# Patient Record
Sex: FEMALE | Race: WHITE | Employment: UNEMPLOYED | URBAN - METROPOLITAN AREA
[De-identification: names, ages, dates, MRNs, and addresses within clinical notes are randomized per-mention and may not be internally consistent; named-entity substitution may affect disease eponyms.]

---

## 2020-11-03 ENCOUNTER — OFFICE VISIT (OUTPATIENT)
Dept: FAMILY MEDICINE CLINIC | Age: 55
End: 2020-11-03
Payer: COMMERCIAL

## 2020-11-03 VITALS
SYSTOLIC BLOOD PRESSURE: 118 MMHG | TEMPERATURE: 97.5 F | HEIGHT: 67 IN | WEIGHT: 137 LBS | HEART RATE: 77 BPM | OXYGEN SATURATION: 98 % | BODY MASS INDEX: 21.5 KG/M2 | DIASTOLIC BLOOD PRESSURE: 70 MMHG

## 2020-11-03 PROBLEM — Z98.890 HISTORY OF RADIAL KERATOTOMY: Status: ACTIVE | Noted: 2019-12-05

## 2020-11-03 PROBLEM — M35.01 KERATITIS SICCA, BILATERAL (HCC): Status: ACTIVE | Noted: 2019-12-05

## 2020-11-03 PROBLEM — H16.223 KERATITIS SICCA, BILATERAL: Status: ACTIVE | Noted: 2019-12-05

## 2020-11-03 PROBLEM — H16.143 SPK (SUPERFICIAL PUNCTATE KERATITIS), BILATERAL: Status: ACTIVE | Noted: 2019-12-05

## 2020-11-03 PROBLEM — M85.80 OSTEOPENIA: Status: ACTIVE | Noted: 2017-07-19

## 2020-11-03 PROBLEM — G47.00 INSOMNIA: Status: ACTIVE | Noted: 2017-07-19

## 2020-11-03 PROBLEM — G47.9 SLEEP DISTURBANCE: Status: ACTIVE | Noted: 2017-07-19

## 2020-11-03 PROBLEM — G56.01 CARPAL TUNNEL SYNDROME OF RIGHT WRIST: Status: ACTIVE | Noted: 2020-11-03

## 2020-11-03 PROCEDURE — 90471 IMMUNIZATION ADMIN: CPT | Performed by: INTERNAL MEDICINE

## 2020-11-03 PROCEDURE — 90686 IIV4 VACC NO PRSV 0.5 ML IM: CPT | Performed by: INTERNAL MEDICINE

## 2020-11-03 PROCEDURE — 99203 OFFICE O/P NEW LOW 30 MIN: CPT | Performed by: INTERNAL MEDICINE

## 2020-11-03 RX ORDER — OYSTER SHELL CALCIUM WITH VITAMIN D 500; 200 MG/1; [IU]/1
1 TABLET, FILM COATED ORAL DAILY
COMMUNITY
Start: 2019-11-18

## 2020-11-03 RX ORDER — MULTIVIT WITH MINERALS/LUTEIN
250 TABLET ORAL DAILY
COMMUNITY

## 2020-11-03 SDOH — HEALTH STABILITY: MENTAL HEALTH: HOW MANY STANDARD DRINKS CONTAINING ALCOHOL DO YOU HAVE ON A TYPICAL DAY?: 1 OR 2

## 2020-11-03 SDOH — HEALTH STABILITY: MENTAL HEALTH: HOW OFTEN DO YOU HAVE A DRINK CONTAINING ALCOHOL?: 2-4 TIMES A MONTH

## 2020-11-03 ASSESSMENT — PATIENT HEALTH QUESTIONNAIRE - PHQ9
SUM OF ALL RESPONSES TO PHQ QUESTIONS 1-9: 0
2. FEELING DOWN, DEPRESSED OR HOPELESS: 0
SUM OF ALL RESPONSES TO PHQ QUESTIONS 1-9: 0
SUM OF ALL RESPONSES TO PHQ9 QUESTIONS 1 & 2: 0
SUM OF ALL RESPONSES TO PHQ QUESTIONS 1-9: 0
1. LITTLE INTEREST OR PLEASURE IN DOING THINGS: 0

## 2020-11-03 NOTE — PROGRESS NOTES
[address] [facility] [id number]     Date of Visit:  11/3/2020  Patient Name: Marquis Rodriguez   Patient :  1965     CHIEF COMPLAINT:     Marquis Rodriguez is a 54 y.o. female who presents today for an general visit to be evaluated for the following condition(s):  Chief Complaint   Patient presents with    New Patient   [address]     Patient also with recent work up due to sudden leg heaviness and weakness as well as foot drop on left. Patient overall a very active person, exercising daily prior to these symptoms. Her gait has been unsteady since even with episodes of falling. Has been working with neurology - has had MRI thoracic spine and EMG completed as part of the work up. REVIEW OF SYSTEM      Review of Systems   Constitutional: Negative. HENT: Negative. Eyes: Negative. Respiratory: Negative. Cardiovascular: Negative. Gastrointestinal: Negative. Endocrine: Negative. Genitourinary: Negative. Musculoskeletal:        Bilateral leg heaviness and weakness   Skin: Negative. Allergic/Immunologic: Negative. Neurological: Negative. Hematological: Negative. Psychiatric/Behavioral: Negative. HISTORY OF PRESENT ILLNESS     HPI  Patient also with recent work up due to sudden leg heaviness and weakness as well as foot drop on left. Patient overall a very active person, exercising daily prior to these symptoms. Her gait has been unsteady since even with episodes of falling. Has been working with neurology - has had MRI thoracic spine and EMG completed as part of the work up.   REVIEWED INFORMATION      No Known Allergies    Patient Active Problem List   Diagnosis    Carpal tunnel syndrome of right wrist    History of radial keratotomy    Insomnia    Keratitis sicca, bilateral ([address]    Osteopenia    Sleep disturbance    SPK (superficial punctate keratitis), bilateral    Somatic dysfunction of hip region    Pain in both lower extremities    Gait abnormality    Weakness of both lower extremities       Past Medical History:   Diagnosis Date    No known health problems        History reviewed. No pertinent surgical history. Social History     Socioeconomic History    Marital status:      Spouse name: None    Number of children: None    Years of education: None    Highest education level: None   Occupational History    None   Social Needs    Financial resource strain: None    Food insecurity     Worry: None     Inability: None    Transportation needs     Medical: None     Non-medical: None   Tobacco Use    Smoking status: Never Smoker    Smokeless tobacco: Never Used   Substance and Sexual Activity    Alcohol use: Yes     Frequency: 2-4 times a month     Drinks per session: 1 or 2     Binge frequency: Never     Comment: socially    Drug use: Never    Sexual activity: Yes     Partners: Male   Lifestyle    Physical activity     Days per week: None     Minutes per session: None    Stress: None   Relationships    Social connections     Talks on phone: None     Gets together: None     Attends Caodaism service: None     Active member of club or organization: None     Attends meetings of clubs or organizations: None     Relationship status: None    Intimate partner violence     Fear of current or ex partner: None     Emotionally abused: None     Physically abused: None     Forced sexual activity: None   Other Topics Concern    None   Social History Narrative    None      Family History   Problem Relation Age of Onset    No Known Problems Mother     No Known Problems Father      PHYSICAL EXAM     /70 (Site: Left Upper Arm, Position: Sitting, Cuff Size: Medium Adult)   Pulse 77   Temp 97.5 °F (36.4 °C) (Temporal)   Ht 5' 7\" (1.702 m)   Wt 137 lb (62.1 kg)   SpO2 98%   BMI 21.46 kg/m²    Physical Exam  Vitals signs and nursing note reviewed.    Constitutional: signed by Lang Feliciano MD on 11/3/2020 at 9:25 AM

## 2020-11-09 PROBLEM — M79.604 PAIN IN BOTH LOWER EXTREMITIES: Status: ACTIVE | Noted: 2020-11-09

## 2020-11-09 PROBLEM — M99.05 SOMATIC DYSFUNCTION OF HIP REGION: Status: ACTIVE | Noted: 2020-11-09

## 2020-11-09 PROBLEM — M79.605 PAIN IN BOTH LOWER EXTREMITIES: Status: ACTIVE | Noted: 2020-11-09

## 2020-11-09 PROBLEM — R29.898 WEAKNESS OF BOTH LOWER EXTREMITIES: Status: ACTIVE | Noted: 2020-11-09

## 2020-11-09 PROBLEM — R26.9 GAIT ABNORMALITY: Status: ACTIVE | Noted: 2020-11-09

## 2020-11-09 ASSESSMENT — ENCOUNTER SYMPTOMS
GASTROINTESTINAL NEGATIVE: 1
EYES NEGATIVE: 1
RESPIRATORY NEGATIVE: 1
ALLERGIC/IMMUNOLOGIC NEGATIVE: 1

## 2020-11-30 ENCOUNTER — TELEPHONE (OUTPATIENT)
Dept: FAMILY MEDICINE CLINIC | Age: 55
End: 2020-11-30

## 2020-11-30 NOTE — TELEPHONE ENCOUNTER
Patient went to [address] [place] [address] Rd     Number: [phone]     Location: [address] [place] [address].  [place], [place]

## 2020-11-30 NOTE — TELEPHONE ENCOUNTER
Can you call [address] to get the report sent to us ASAP and then scan it into the system? Once the report is scanned can you send me a message? Also - can you call the patient back and apologize for the delay as we haven't been sent the report yet. Let her know that we are re requesting the results and will let her know as soon as the results come in. Thank you.

## 2020-12-03 ENCOUNTER — HOSPITAL ENCOUNTER (OUTPATIENT)
Dept: GENERAL RADIOLOGY | Age: 55
Discharge: HOME | End: 2020-12-05
Payer: COMMERCIAL

## 2020-12-03 ENCOUNTER — HOSPITAL ENCOUNTER (OUTPATIENT)
Age: 55
Discharge: HOME | End: 2020-12-05
Payer: COMMERCIAL

## 2020-12-03 ENCOUNTER — HOSPITAL ENCOUNTER (OUTPATIENT)
Dept: ULTRASOUND IMAGING | Age: 55
Discharge: HOME | End: 2020-12-05
Payer: COMMERCIAL

## 2020-12-03 ENCOUNTER — OFFICE VISIT (OUTPATIENT)
Dept: FAMILY MEDICINE CLINIC | Age: 55
End: 2020-12-03
Payer: COMMERCIAL

## 2020-12-03 VITALS
DIASTOLIC BLOOD PRESSURE: 88 MMHG | HEIGHT: 67 IN | SYSTOLIC BLOOD PRESSURE: 102 MMHG | HEART RATE: 66 BPM | WEIGHT: 137 LBS | TEMPERATURE: 97.2 F | OXYGEN SATURATION: 98 % | BODY MASS INDEX: 21.5 KG/M2 | RESPIRATION RATE: 16 BRPM

## 2020-12-03 PROBLEM — R20.2 NUMBNESS AND TINGLING OF LEFT LOWER EXTREMITY: Status: ACTIVE | Noted: 2020-12-03

## 2020-12-03 PROBLEM — M79.89 SWELLING OF LEFT LOWER EXTREMITY: Status: ACTIVE | Noted: 2020-12-03

## 2020-12-03 PROBLEM — R20.2 NUMBNESS AND TINGLING OF RIGHT LOWER EXTREMITY: Status: ACTIVE | Noted: 2020-12-03

## 2020-12-03 PROBLEM — R26.81 UNSTEADY GAIT WHEN WALKING: Status: ACTIVE | Noted: 2020-12-03

## 2020-12-03 PROBLEM — M79.661 PAIN OF RIGHT CALF: Status: ACTIVE | Noted: 2020-12-03

## 2020-12-03 PROBLEM — R20.0 NUMBNESS AND TINGLING OF LEFT LOWER EXTREMITY: Status: ACTIVE | Noted: 2020-12-03

## 2020-12-03 PROBLEM — M79.89 SWELLING OF RIGHT EXTREMITY: Status: ACTIVE | Noted: 2020-12-03

## 2020-12-03 PROBLEM — M79.662 PAIN OF LEFT CALF: Status: ACTIVE | Noted: 2020-12-03

## 2020-12-03 PROBLEM — R20.0 NUMBNESS AND TINGLING OF RIGHT LOWER EXTREMITY: Status: ACTIVE | Noted: 2020-12-03

## 2020-12-03 PROCEDURE — 72100 X-RAY EXAM L-S SPINE 2/3 VWS: CPT

## 2020-12-03 PROCEDURE — 93971 EXTREMITY STUDY: CPT

## 2020-12-03 PROCEDURE — 99214 OFFICE O/P EST MOD 30 MIN: CPT | Performed by: INTERNAL MEDICINE

## 2020-12-03 ASSESSMENT — ENCOUNTER SYMPTOMS
EYES NEGATIVE: 1
ALLERGIC/IMMUNOLOGIC NEGATIVE: 1
GASTROINTESTINAL NEGATIVE: 1
RESPIRATORY NEGATIVE: 1

## 2020-12-03 NOTE — PROGRESS NOTES
[address] [place] [id number]     Date of Visit:  12/3/2020  Patient Name: Serena Friedman   Patient :  1965     CHIEF COMPLAINT:     Serena Friedman is a 54 y.o. female who presents today for an general visit to be evaluated for the following condition(s):  Chief Complaint   Patient presents with    Other     bilateral tingling,burning to legs,unstable at times       REVIEW OF SYSTEM      Review of Systems   Constitutional: Negative. HENT: Negative. Eyes: Negative. Respiratory: Negative. Cardiovascular: Negative. Gastrointestinal: Negative. Endocrine: Negative. Genitourinary: Negative. Musculoskeletal: Positive for arthralgias and gait problem. Pain in BLE    PAN IN CALVES L > R   Skin: Negative. Allergic/Immunologic: Negative. Neurological: Positive for weakness and numbness. Numbness/tingling BLE L > R   Hematological: Negative. Psychiatric/Behavioral: Negative. All other systems reviewed and are negative. HISTORY OF PRESENT ILLNESS     HPI  Patient here for follow up appt for bilateral leg weakness and tingling,  This has been an ongoing issue since September - she has had a full workup with neurology and has not improved. Patient also c/o significant pain/burning/tingling in bilateral calves left greater than right. Patient started PT and not sure if she has noticed much improvement yet.       REVIEWED INFORMATION      No Known Allergies    Patient Active Problem List   Diagnosis    Carpal tunnel syndrome of right wrist    History of radial keratotomy    Insomnia    Keratitis sicca, bilateral (HCC)    Osteopenia    Sleep disturbance    SPK (superficial punctate keratitis), bilateral    Somatic dysfunction of hip region    Pain in both lower extremities    Gait abnormality    Weakness of both lower extremities    Pain of left calf    Numbness and tingling of right lower extremity    Numbness and tingling of left lower extremity    Unsteady gait when walking    Swelling of right extremity    Pain of right calf    Swelling of left lower extremity       Past Medical History:   Diagnosis Date    No known health problems        History reviewed. No pertinent surgical history. Social History     Socioeconomic History    Marital status:      Spouse name: None    Number of children: None    Years of education: None    Highest education level: None   Occupational History    None   Social Needs    Financial resource strain: None    Food insecurity     Worry: None     Inability: None    Transportation needs     Medical: None     Non-medical: None   Tobacco Use    Smoking status: Never Smoker    Smokeless tobacco: Never Used   Substance and Sexual Activity    Alcohol use: Yes     Frequency: 2-4 times a month     Drinks per session: 1 or 2     Binge frequency: Never     Comment: socially    Drug use: Never    Sexual activity: Yes     Partners: Male   Lifestyle    Physical activity     Days per week: None     Minutes per session: None    Stress: None   Relationships    Social connections     Talks on phone: None     Gets together: None     Attends Confucianism service: None     Active member of club or organization: None     Attends meetings of clubs or organizations: None     Relationship status: None    Intimate partner violence     Fear of current or ex partner: None     Emotionally abused: None     Physically abused: None     Forced sexual activity: None   Other Topics Concern    None   Social History Narrative    None        PHYSICAL EXAM     /88   Pulse 66   Temp 97.2 °F (36.2 °C)   Resp 16   Ht 5' 7\" (1.702 m)   Wt 137 lb (62.1 kg)   SpO2 98%   BMI 21.46 kg/m²    Physical Exam  Vitals signs and nursing note reviewed. Constitutional:       Appearance: Normal appearance. She is normal weight.    HENT:      Head: Normocephalic and atraumatic. Right Ear: External ear normal.      Left Ear: External ear normal.      Nose: Nose normal.      Mouth/Throat:      Mouth: Mucous membranes are moist.   Eyes:      Extraocular Movements: Extraocular movements intact. Conjunctiva/sclera: Conjunctivae normal.   Neck:      Musculoskeletal: Normal range of motion. Cardiovascular:      Rate and Rhythm: Normal rate and regular rhythm. Pulmonary:      Effort: Pulmonary effort is normal.      Breath sounds: Normal breath sounds. Abdominal:      General: Abdomen is flat. Bowel sounds are normal.      Palpations: Abdomen is soft. Musculoskeletal:      Comments: + SI dysfunction at pelvis with left leg down but improved since last visit    TTP in bilateral calves L >R   Neurological:      Mental Status: She is alert. ASSESSMENT/PLAN     1. Weakness of both lower extremities  - XR LUMBAR SPINE (MIN 4 VIEWS); Future  - MRI LUMBAR SPINE WO CONTRAST; Future    2. Numbness and tingling of left lower extremity  - XR LUMBAR SPINE (MIN 4 VIEWS); Future  - MRI LUMBAR SPINE WO CONTRAST; Future  - US DUP LOWER EXTREMITY LEFT BLAKE; Future    3. Numbness and tingling of right lower extremity  - XR LUMBAR SPINE (MIN 4 VIEWS); Future  - MRI LUMBAR SPINE WO CONTRAST; Future  - US DUP LOWER EXTREMITY RIGHT BLAKE; Future    4. Unsteady gait when walking  - MRI LUMBAR SPINE WO CONTRAST; Future    5. Left foot drop  - MRI LUMBAR SPINE WO CONTRAST; Future    6. Pain of left calf  - US DUP LOWER EXTREMITY LEFT BLAKE; Future    7. Swelling of left lower extremity  - US DUP LOWER EXTREMITY LEFT BLAKE; Future    8. Pain of right calf  - US DUP LOWER EXTREMITY RIGHT BLAKE; Future    9. Swelling of right extremity  - US DUP LOWER EXTREMITY RIGHT BLAKE;  Future    Records reviewed    Bilateral calf pain/burning/tingling - check STAT bilateral lower extremity dopplers to check for DVTs     Health maint - mammogram and colonoscopy are up to date, immunizations are up to date, screening labs are up to date     Leg pain and weakness - sudden with falling - working with neurology, labs are normal, MRI thoracic spine and EMG do not show obvious cause, MRI brain normal, not improving with PT, will check MRI lumbar spine.     SI dysfunction with leg length abn due to rotation of hip - we discussed this may be part of the gait issue but unusual to present suddenly, I do recommend Physical Therapy with [facility], also to consider massage, patient to call with update     Physical and labs in 1 year     Patient to call with update      COMMUNICATION:       Electronically signed by Yash Aranda MD on 12/3/2020 at 11:40 AM

## 2020-12-08 ENCOUNTER — HOSPITAL ENCOUNTER (OUTPATIENT)
Dept: MRI IMAGING | Age: 55
Discharge: HOME | End: 2020-12-10
Payer: COMMERCIAL

## 2020-12-08 ENCOUNTER — TELEPHONE (OUTPATIENT)
Dept: FAMILY MEDICINE CLINIC | Age: 55
End: 2020-12-08

## 2020-12-08 PROCEDURE — 72148 MRI LUMBAR SPINE W/O DYE: CPT

## 2020-12-08 NOTE — RESULT ENCOUNTER NOTE
Spoke with patient regarding MRI results. I recommend to continue Physical Therapy but I also would like her to see Dr. Pretty Parks with PMR to get further evaluation and consultation on next steps.

## 2020-12-09 ENCOUNTER — TELEPHONE (OUTPATIENT)
Dept: FAMILY MEDICINE CLINIC | Age: 55
End: 2020-12-09

## 2020-12-09 NOTE — TELEPHONE ENCOUNTER
Pt called and said Dr. Corey Howe referred her to a PMR Dr. She needs a referral sent to the Dr. She was referred to. They have not received one. She also said their office is booking out till the end of January as of now.

## 2020-12-09 NOTE — TELEPHONE ENCOUNTER
Referral to PMR Dr. Juan Levine is in her chart - can you fax the referral to his office at [facility]?     Thank you

## 2020-12-14 ENCOUNTER — TELEPHONE (OUTPATIENT)
Dept: FAMILY MEDICINE CLINIC | Age: 55
End: 2020-12-14

## 2021-01-18 NOTE — TELEPHONE ENCOUNTER
PT WAITING TO HEAR FROM DR. Zakiya Mendoza Patient had a MRI done of the lumbar spine. - results are in. Patient would like a call back from PCP for Benito Torres about the results. Please advise.

## 2021-04-21 ENCOUNTER — TELEPHONE (OUTPATIENT)
Dept: FAMILY MEDICINE CLINIC | Age: 56
End: 2021-04-21

## 2021-04-21 DIAGNOSIS — R29.898 WEAKNESS OF BOTH LOWER EXTREMITIES: Primary | ICD-10-CM

## 2021-04-21 DIAGNOSIS — R20.2 NUMBNESS AND TINGLING OF LEFT LOWER EXTREMITY: ICD-10-CM

## 2021-04-21 DIAGNOSIS — R20.2 NUMBNESS AND TINGLING OF RIGHT LOWER EXTREMITY: ICD-10-CM

## 2021-04-21 DIAGNOSIS — R20.0 NUMBNESS AND TINGLING OF RIGHT LOWER EXTREMITY: ICD-10-CM

## 2021-04-21 DIAGNOSIS — R26.81 UNSTEADY GAIT WHEN WALKING: ICD-10-CM

## 2021-04-21 DIAGNOSIS — R20.0 NUMBNESS AND TINGLING OF LEFT LOWER EXTREMITY: ICD-10-CM

## 2021-04-21 NOTE — TELEPHONE ENCOUNTER
Patient is asking for a referral to a Dr. Hernandez Gautam in [place], [place]. He is a neurologist. Patient has tried steroid injections and they don't seem to work for her - wants to see a neurologist for a consult.         Fax: [phone]

## 2021-04-22 ENCOUNTER — TELEPHONE (OUTPATIENT)
Dept: FAMILY MEDICINE CLINIC | Age: 56
End: 2021-04-22

## 2021-06-28 ENCOUNTER — TELEPHONE (OUTPATIENT)
Dept: FAMILY MEDICINE CLINIC | Age: 56
End: 2021-06-28

## 2021-06-28 DIAGNOSIS — R20.0 NUMBNESS AND TINGLING OF RIGHT LOWER EXTREMITY: ICD-10-CM

## 2021-06-28 DIAGNOSIS — R20.2 NUMBNESS AND TINGLING OF RIGHT LOWER EXTREMITY: ICD-10-CM

## 2021-06-28 DIAGNOSIS — R20.0 NUMBNESS AND TINGLING OF LEFT LOWER EXTREMITY: ICD-10-CM

## 2021-06-28 DIAGNOSIS — R26.81 UNSTEADY GAIT WHEN WALKING: ICD-10-CM

## 2021-06-28 DIAGNOSIS — M48.061 SPINAL STENOSIS OF LUMBAR REGION, UNSPECIFIED WHETHER NEUROGENIC CLAUDICATION PRESENT: Primary | ICD-10-CM

## 2021-06-28 DIAGNOSIS — R20.2 NUMBNESS AND TINGLING OF LEFT LOWER EXTREMITY: ICD-10-CM

## 2021-06-28 DIAGNOSIS — R29.898 WEAKNESS OF BOTH LOWER EXTREMITIES: ICD-10-CM

## 2021-06-28 NOTE — TELEPHONE ENCOUNTER
Pt would like a referral to Dr. Ritu Golbderg for back pain and tingling in legs. Her appt is Friday with him and they need the referral before then.  The fax number is [phone]

## 2021-09-30 ENCOUNTER — OFFICE VISIT (OUTPATIENT)
Dept: FAMILY MEDICINE CLINIC | Age: 56
End: 2021-09-30
Payer: COMMERCIAL

## 2021-09-30 VITALS
OXYGEN SATURATION: 100 % | BODY MASS INDEX: 22.66 KG/M2 | WEIGHT: 141 LBS | HEIGHT: 66 IN | DIASTOLIC BLOOD PRESSURE: 66 MMHG | SYSTOLIC BLOOD PRESSURE: 124 MMHG | HEART RATE: 75 BPM

## 2021-09-30 DIAGNOSIS — Z13.0 SCREENING FOR DEFICIENCY ANEMIA: ICD-10-CM

## 2021-09-30 DIAGNOSIS — Z13.6 SCREENING FOR CARDIOVASCULAR CONDITION: ICD-10-CM

## 2021-09-30 DIAGNOSIS — Z13.220 SCREENING FOR LIPID DISORDERS: ICD-10-CM

## 2021-09-30 DIAGNOSIS — Z23 FLU VACCINE NEED: ICD-10-CM

## 2021-09-30 DIAGNOSIS — Z00.00 HEALTHCARE MAINTENANCE: Primary | ICD-10-CM

## 2021-09-30 DIAGNOSIS — Z13.1 SCREENING FOR DIABETES MELLITUS: ICD-10-CM

## 2021-09-30 PROCEDURE — 99396 PREV VISIT EST AGE 40-64: CPT | Performed by: INTERNAL MEDICINE

## 2021-09-30 SDOH — ECONOMIC STABILITY: FOOD INSECURITY: WITHIN THE PAST 12 MONTHS, YOU WORRIED THAT YOUR FOOD WOULD RUN OUT BEFORE YOU GOT MONEY TO BUY MORE.: NEVER TRUE

## 2021-09-30 SDOH — ECONOMIC STABILITY: FOOD INSECURITY: WITHIN THE PAST 12 MONTHS, THE FOOD YOU BOUGHT JUST DIDN'T LAST AND YOU DIDN'T HAVE MONEY TO GET MORE.: NEVER TRUE

## 2021-09-30 ASSESSMENT — PATIENT HEALTH QUESTIONNAIRE - PHQ9
SUM OF ALL RESPONSES TO PHQ QUESTIONS 1-9: 0
SUM OF ALL RESPONSES TO PHQ9 QUESTIONS 1 & 2: 0
SUM OF ALL RESPONSES TO PHQ QUESTIONS 1-9: 0
2. FEELING DOWN, DEPRESSED OR HOPELESS: 0
1. LITTLE INTEREST OR PLEASURE IN DOING THINGS: 0
SUM OF ALL RESPONSES TO PHQ QUESTIONS 1-9: 0

## 2021-09-30 ASSESSMENT — ENCOUNTER SYMPTOMS
ALLERGIC/IMMUNOLOGIC NEGATIVE: 1
GASTROINTESTINAL NEGATIVE: 1
EYES NEGATIVE: 1
RESPIRATORY NEGATIVE: 1

## 2021-09-30 ASSESSMENT — SOCIAL DETERMINANTS OF HEALTH (SDOH): HOW HARD IS IT FOR YOU TO PAY FOR THE VERY BASICS LIKE FOOD, HOUSING, MEDICAL CARE, AND HEATING?: NOT HARD AT ALL

## 2021-09-30 NOTE — PROGRESS NOTES
[address]   [facility] [address] [facility]  [address]  [facility] [address]      Date of Visit:  2021  Patient Name: Alba Jefferson   Patient :  1965     CHIEF COMPLAINT:     Alba Jefferson is a 64 y.o. female who presents today for an general visit to be evaluated for the following condition(s):  Chief Complaint   Patient presents with    Annual Exam       REVIEW OF SYSTEM      Review of Systems   Constitutional: Negative. HENT: Negative. Eyes: Negative. Respiratory: Negative. Cardiovascular: Negative. Gastrointestinal: Negative. Endocrine: Negative. Genitourinary: Negative. Musculoskeletal: Negative. Tingling and weakness in leg   Skin: Negative. Allergic/Immunologic: Negative. Neurological: Negative. Tingling and weakness in leg   Hematological: Negative. Psychiatric/Behavioral: Negative. All other systems reviewed and are negative. HISTORY OF PRESENT ILLNESS     HPI   Patient is here for annual physcial.  Patient has not been able to get screening labs drawn yet. She has been working with specialists due to her leg symptoms and weakness. She is scheduled for a L4-L5 laminectomy in a few weeks with neurosurgery at [facility]. She has her preop testing and visit scheduled there as well.     Healthcare Maintenance  Screening labs due and ordered  Mammogram completed in 2021  Last colonoscopy   Immunizations are up to date including COVID, will wait until after surgery for flu vaccine            REVIEWED INFORMATION      No Known Allergies    Patient Active Problem List   Diagnosis    Carpal tunnel syndrome of right wrist    History of radial keratotomy    Insomnia    Keratitis sicca, bilateral ([address]    Osteopenia    Sleep disturbance    SPK (superficial punctate keratitis), bilateral    Somatic dysfunction of hip region    Pain in both lower extremities    Gait abnormality    Weakness of both lower extremities    Pain of left calf    Numbness and tingling of right lower extremity    Numbness and tingling of left lower extremity    Unsteady gait when walking    Swelling of right extremity    Pain of right calf    Swelling of left lower extremity       Past Medical History:   Diagnosis Date    No known health problems        Past Surgical History:   Procedure Laterality Date    CHOLECYSTECTOMY      1990    COLONOSCOPY      2014, next one due in 2024        Social History     Socioeconomic History    Marital status:      Spouse name: Not on file    Number of children: Not on file    Years of education: Not on file    Highest education level: Not on file   Occupational History    Not on file   Tobacco Use    Smoking status: Never Smoker    Smokeless tobacco: Never Used   Substance and Sexual Activity    Alcohol use: Yes     Comment: socially    Drug use: Never    Sexual activity: Yes     Partners: Male   Other Topics Concern    Not on file   Social History Narrative    Not on file     Social Determinants of Health     Financial Resource Strain:     Difficulty of Paying Living Expenses:    Food Insecurity:     Worried About Running Out of Food in the Last Year:     Ran Out of Food in the Last Year:    Transportation Needs:     Lack of Transportation (Medical):      Lack of Transportation (Non-Medical):    Physical Activity:     Days of Exercise per Week:     Minutes of Exercise per Session:    Stress:     Feeling of Stress :    Social Connections:     Frequency of Communication with Friends and Family:     Frequency of Social Gatherings with Friends and Family:     Attends Roman Catholic Services:     Active Member of Clubs or Organizations:     Attends Club or Organization Meetings:     Marital Status:    Intimate Partner Violence:     Fear of Current or Ex-Partner:     Emotionally Abused:     Physically Abused:     Sexually Abused: Family History   Problem Relation Age of Onset    No Known Problems Mother     No Known Problems Father        PHYSICAL EXAM     /66   Pulse 75   Ht 5' 6\" (1.676 m)   Wt 141 lb (64 kg)   SpO2 100%   BMI 22.76 kg/m²    Physical Exam  Vitals and nursing note reviewed. Constitutional:       Appearance: Normal appearance. She is normal weight. HENT:      Head: Normocephalic and atraumatic. Right Ear: Tympanic membrane, ear canal and external ear normal.      Left Ear: Tympanic membrane, ear canal and external ear normal.      Nose: Nose normal.      Mouth/Throat:      Mouth: Mucous membranes are moist.   Eyes:      Extraocular Movements: Extraocular movements intact. Conjunctiva/sclera: Conjunctivae normal.      Pupils: Pupils are equal, round, and reactive to light. Cardiovascular:      Rate and Rhythm: Normal rate and regular rhythm. Pulses: Normal pulses. Heart sounds: Normal heart sounds. Pulmonary:      Effort: Pulmonary effort is normal.      Breath sounds: Normal breath sounds. Abdominal:      General: Abdomen is flat. Bowel sounds are normal.      Palpations: Abdomen is soft. Musculoskeletal:      Cervical back: Normal range of motion and neck supple. Skin:     General: Skin is warm. Capillary Refill: Capillary refill takes less than 2 seconds. Neurological:      General: No focal deficit present. Mental Status: She is alert and oriented to person, place, and time. Psychiatric:         Mood and Affect: Mood normal.         Behavior: Behavior normal.         Thought Content: Thought content normal.         Judgment: Judgment normal.         ASSESSMENT/PLAN       1. Screening for lipid disorders  - C-Reactive Protein; Future    2. Screening for diabetes mellitus  - Comprehensive Metabolic Panel, Fasting; Future    3. Screening for cardiovascular condition  - C-Reactive Protein; Future    4.  Screening for deficiency anemia  - CBC With Auto Differential; Future    5. Healthcare maintenance    Records reviewed    Healthcare Maintenance  Screening labs due and ordered  Mammogram completed in June 2021  Last colonoscopy 2014  Immunizations are up to date including COVID, will wait until after surgery for flu vaccine    Scheduled for L4-L5 laminectomy in October with Rad[facility] Valenzuela , patient also has presurgical tesitng scheduled at [facility] as well.         COMMUNICATION:       Electronically signed by Sunshine Crooks MD on 9/30/2021 at 11:52 AM

## 2021-10-30 PROBLEM — Z13.0 SCREENING FOR DEFICIENCY ANEMIA: Status: RESOLVED | Noted: 2021-09-30 | Resolved: 2021-10-30

## 2021-10-30 PROBLEM — Z13.1 SCREENING FOR DIABETES MELLITUS: Status: RESOLVED | Noted: 2021-09-30 | Resolved: 2021-10-30

## 2021-10-30 PROBLEM — Z13.220 SCREENING FOR LIPID DISORDERS: Status: RESOLVED | Noted: 2021-09-30 | Resolved: 2021-10-30

## 2021-10-30 PROBLEM — Z13.6 SCREENING FOR CARDIOVASCULAR CONDITION: Status: RESOLVED | Noted: 2021-09-30 | Resolved: 2021-10-30

## 2021-10-30 PROBLEM — Z00.00 HEALTHCARE MAINTENANCE: Status: RESOLVED | Noted: 2021-09-30 | Resolved: 2021-10-30

## 2022-01-05 ENCOUNTER — TELEPHONE (OUTPATIENT)
Dept: FAMILY MEDICINE CLINIC | Age: 57
End: 2022-01-05

## 2022-01-05 NOTE — TELEPHONE ENCOUNTER
Pt states she had a DEXA Scan done last week at [facility] and received the results a few days ago. Pt states she is concerned about the results and is wondering if Dr. Pia Burk can review them so they can discuss them. Pt is wondering with the results she received if she should start taking a medication or if Dr. Pia Burk recommends doing something specific to help. Pt isn't sure if Dr. Pia Burk has access to the results, but if she doesn't the pt states she will have them sent to her. Please Advise.

## 2022-01-07 ENCOUNTER — OFFICE VISIT (OUTPATIENT)
Dept: FAMILY MEDICINE CLINIC | Age: 57
End: 2022-01-07
Payer: COMMERCIAL

## 2022-01-07 ENCOUNTER — TELEPHONE (OUTPATIENT)
Dept: FAMILY MEDICINE CLINIC | Age: 57
End: 2022-01-07

## 2022-01-07 DIAGNOSIS — M85.88 OSTEOPENIA OF LUMBAR SPINE: Primary | ICD-10-CM

## 2022-01-07 PROCEDURE — 99213 OFFICE O/P EST LOW 20 MIN: CPT | Performed by: INTERNAL MEDICINE

## 2022-01-07 ASSESSMENT — ENCOUNTER SYMPTOMS
EYES NEGATIVE: 1
GASTROINTESTINAL NEGATIVE: 1
RESPIRATORY NEGATIVE: 1
ALLERGIC/IMMUNOLOGIC NEGATIVE: 1

## 2022-01-07 NOTE — TELEPHONE ENCOUNTER
Ty Prom - can you set up at 4:00 pm virtual visit with Alyce today? I am not sure who ordered the dexa scan as I don't see it in my notes or orders. I am happy to talk with her about it but It is much easier via a virtual visit.

## 2022-01-07 NOTE — TELEPHONE ENCOUNTER
Pt states OB ordered, but she doesn't remember name as she just switched.  I did not see any OB encounter

## 2022-01-07 NOTE — TELEPHONE ENCOUNTER
----- Message from Randi Webb sent at 1/7/2022 12:57 PM EST -----  Subject: Message to Provider    QUESTIONS  Information for Provider? Patient calling back, she had Dexa Scan done @   [facility] and wanted to talk to PCP about them. Wanted to see if she   received them, and wants to discuss further steps with her meds, tried to   get patient to office, someone was getting me back but the phone went   dead, tried a second time was told Evelio Baileye is not at the practice at   all.   ---------------------------------------------------------------------------  --------------  [employer]  What is the best way for the office to contact you? OK to leave message on   voicemail  Preferred Call Back Phone Number? [phone]  ---------------------------------------------------------------------------  --------------  SCRIPT ANSWERS  Relationship to Patient?  Self

## 2022-01-07 NOTE — PROGRESS NOTES
[address]   [facility]  [address]  [facility], [facility] [address]      Date of Visit:  2022  Patient Name: Yeimy Aguayo   Patient :  1965     CHIEF COMPLAINT:     Yeimy Aguayo is a 64 y.o. female who presents today for an general visit to be evaluated for the following condition(s):  Chief Complaint   Patient presents with    Results       REVIEW OF SYSTEM      Review of Systems   Constitutional: Negative. HENT: Negative. Eyes: Negative. Respiratory: Negative. Cardiovascular: Negative. Gastrointestinal: Negative. Endocrine: Negative. Genitourinary: Negative. Musculoskeletal: Negative. Skin: Negative. Allergic/Immunologic: Negative. Neurological: Negative. Hematological: Negative. Psychiatric/Behavioral: Negative. All other systems reviewed and are negative. HISTORY OF PRESENT ILLNESS     HPI  Patient presents for evaluation of dexa scan results that was ordered from her OB.         REVIEWED INFORMATION      No Known Allergies    Patient Active Problem List   Diagnosis    Carpal tunnel syndrome of right wrist    History of radial keratotomy    Insomnia    Keratitis sicca, bilateral ([address]    Osteopenia    Sleep disturbance    SPK (superficial punctate keratitis), bilateral    Somatic dysfunction of hip region    Pain in both lower extremities    Gait abnormality    Weakness of both lower extremities    Pain of left calf    Numbness and tingling of right lower extremity    Numbness and tingling of left lower extremity    Unsteady gait when walking    Swelling of right extremity    Pain of right calf    Swelling of left lower extremity       Past Medical History:   Diagnosis Date    No known health problems     Spinal stenosis, lumbar        Past Surgical History:   Procedure Laterality Date    CHOLECYSTECTOMY          COLONOSCOPY      , next one due in 2024        Social History     Socioeconomic History    Marital status:      Spouse name: Not on file    Number of children: Not on file    Years of education: Not on file    Highest education level: Not on file   Occupational History    Not on file   Tobacco Use    Smoking status: Never Smoker    Smokeless tobacco: Never Used   Vaping Use    Vaping Use: Never used   Substance and Sexual Activity    Alcohol use: Yes     Comment: socially    Drug use: Never    Sexual activity: Yes     Partners: Male   Other Topics Concern    Not on file   Social History Narrative    Not on file     Social Determinants of Health     Financial Resource Strain: Low Risk     Difficulty of Paying Living Expenses: Not hard at all   Food Insecurity: No Food Insecurity    Worried About Running Out of Food in the Last Year: Never true    [address] in the Last Year: Never true   Transportation Needs:     Lack of Transportation (Medical): Not on file    Lack of Transportation (Non-Medical):  Not on file   Physical Activity:     Days of Exercise per Week: Not on file    Minutes of Exercise per Session: Not on file   Stress:     Feeling of Stress : Not on file   Social Connections:     Frequency of Communication with Friends and Family: Not on file    Frequency of Social Gatherings with Friends and Family: Not on file    Attends Jewish Services: Not on file    Active Member of [address] [place] [address] or Organizations: Not on file    Attends Club or Organization Meetings: Not on file    Marital Status: Not on file   Intimate Partner Violence:     Fear of Current or Ex-Partner: Not on file    Emotionally Abused: Not on file    Physically Abused: Not on file    Sexually Abused: Not on file   Housing Stability:     Unable to Pay for Housing in the Last Year: Not on file    Number of [place] in the Last Year: Not on file    Unstable Housing in the Last Year: Not on file        Family History   Problem Relation Age of Onset    No Known Problems Mother     No Known Problems Father        PHYSICAL EXAM      Vitals and Exam limited due to virtual visit  Physical Exam    ASSESSMENT/PLAN     1. Osteopenia of lumbar spine  - Lydia Chandler MD, Endocrinology, [facility] reviewed    Osteopenia - reviewed results of dexascan that was ordered by her OB and completed at [address] on 12/30/2021. Osteopenia is most significant in the lumbar spine with a T score of -2.2. Patient concerned due to recently having back surgery L4-L5 region. We discussed options. Patient agrees to see endocrine Dr. Ismael Almaraz for his input into if medication is recommended and also if there could be secondary causes to her osteopenia.       COMMUNICATION:       Electronically signed by Adolfo Crockett MD on 1/7/2022 at 3:59 PM

## 2022-01-20 ENCOUNTER — TELEPHONE (OUTPATIENT)
Dept: FAMILY MEDICINE CLINIC | Age: 57
End: 2022-01-20

## 2022-01-20 DIAGNOSIS — M99.05 SOMATIC DYSFUNCTION OF HIP REGION: Primary | ICD-10-CM

## 2022-01-20 DIAGNOSIS — M48.061 SPINAL STENOSIS OF LUMBAR REGION, UNSPECIFIED WHETHER NEUROGENIC CLAUDICATION PRESENT: ICD-10-CM

## 2022-01-20 NOTE — TELEPHONE ENCOUNTER
[facility] AND [facility] - can you set up a virtual visit with me tomorrow so I can document for the Physical therapy request?      I can do between 10 am and noon, or between 3 pm and 4 pm.    Thank you

## 2022-01-20 NOTE — TELEPHONE ENCOUNTER
Patient is currently in [place]. Patient is wanting to a referral to PT. She had back surgery October. Saw a chiropractor in [place] and they are telling patient her hips are off and she should see PT for this. Please advise.

## 2022-01-21 NOTE — TELEPHONE ENCOUNTER
Patient stated that she discussed PT at last appointment and dose not want to have another appointment. Patient said if this is the only way, she would like just a phone call at three pm.  Please let me know how to proceed.

## 2022-04-20 ENCOUNTER — TELEPHONE (OUTPATIENT)
Dept: FAMILY MEDICINE CLINIC | Age: 57
End: 2022-04-20

## 2022-04-20 DIAGNOSIS — M85.88 OSTEOPENIA OF LUMBAR SPINE: Primary | ICD-10-CM

## 2022-04-20 NOTE — TELEPHONE ENCOUNTER
Patient needs a new referral for Endocrindology Dr Sneha Ball and needs to be faxed to # [phone].

## 2022-06-06 ENCOUNTER — TELEPHONE (OUTPATIENT)
Dept: FAMILY MEDICINE CLINIC | Age: 57
End: 2022-06-06

## 2022-06-06 NOTE — TELEPHONE ENCOUNTER
Pt requesting appt: she is okay with vv.    Needs something to take edge off and help with sleep. Please advise.

## 2022-06-07 ENCOUNTER — TELEMEDICINE (OUTPATIENT)
Dept: FAMILY MEDICINE CLINIC | Age: 57
End: 2022-06-07
Payer: COMMERCIAL

## 2022-06-07 DIAGNOSIS — F41.9 ANXIETY: Primary | ICD-10-CM

## 2022-06-07 DIAGNOSIS — G47.00 INSOMNIA, UNSPECIFIED TYPE: ICD-10-CM

## 2022-06-07 PROCEDURE — 99213 OFFICE O/P EST LOW 20 MIN: CPT | Performed by: INTERNAL MEDICINE

## 2022-06-07 RX ORDER — ZOLPIDEM TARTRATE 10 MG/1
10 TABLET ORAL NIGHTLY PRN
Qty: 30 TABLET | Refills: 2 | Status: SHIPPED | OUTPATIENT
Start: 2022-06-07 | End: 2022-09-05

## 2022-06-07 ASSESSMENT — PATIENT HEALTH QUESTIONNAIRE - PHQ9
7. TROUBLE CONCENTRATING ON THINGS, SUCH AS READING THE NEWSPAPER OR WATCHING TELEVISION: 0
9. THOUGHTS THAT YOU WOULD BE BETTER OFF DEAD, OR OF HURTING YOURSELF: 0
SUM OF ALL RESPONSES TO PHQ QUESTIONS 1-9: 8
SUM OF ALL RESPONSES TO PHQ9 QUESTIONS 1 & 2: 3
2. FEELING DOWN, DEPRESSED OR HOPELESS: 2
8. MOVING OR SPEAKING SO SLOWLY THAT OTHER PEOPLE COULD HAVE NOTICED. OR THE OPPOSITE, BEING SO FIGETY OR RESTLESS THAT YOU HAVE BEEN MOVING AROUND A LOT MORE THAN USUAL: 0
1. LITTLE INTEREST OR PLEASURE IN DOING THINGS: 1
4. FEELING TIRED OR HAVING LITTLE ENERGY: 2
10. IF YOU CHECKED OFF ANY PROBLEMS, HOW DIFFICULT HAVE THESE PROBLEMS MADE IT FOR YOU TO DO YOUR WORK, TAKE CARE OF THINGS AT HOME, OR GET ALONG WITH OTHER PEOPLE: 0
5. POOR APPETITE OR OVEREATING: 0
SUM OF ALL RESPONSES TO PHQ QUESTIONS 1-9: 8
6. FEELING BAD ABOUT YOURSELF - OR THAT YOU ARE A FAILURE OR HAVE LET YOURSELF OR YOUR FAMILY DOWN: 0
SUM OF ALL RESPONSES TO PHQ QUESTIONS 1-9: 8
SUM OF ALL RESPONSES TO PHQ QUESTIONS 1-9: 8
3. TROUBLE FALLING OR STAYING ASLEEP: 3

## 2022-06-07 ASSESSMENT — ENCOUNTER SYMPTOMS
ALLERGIC/IMMUNOLOGIC NEGATIVE: 1
RESPIRATORY NEGATIVE: 1
GASTROINTESTINAL NEGATIVE: 1
EYES NEGATIVE: 1

## 2022-06-07 NOTE — PROGRESS NOTES
[address]   [facility]  [address]  [place], [facility] [address]      Date of Visit:  2022  Patient Name: Seymour Patricia   Patient :  1965     CHIEF COMPLAINT:     Seymour Patricia is a 62 y.o. female who presents today for an general visit to be evaluated for the following condition(s):  Chief Complaint   Patient presents with    Discuss Medications     looking to take the edge off/ help with sleep       REVIEW OF SYSTEM      Review of Systems   Constitutional: Negative. HENT: Negative. Eyes: Negative. Respiratory: Negative. Cardiovascular: Negative. Gastrointestinal: Negative. Endocrine: Negative. Genitourinary: Negative. Musculoskeletal: Negative. Skin: Negative. Allergic/Immunologic: Negative. Neurological: Negative. Hematological: Negative. Psychiatric/Behavioral: Positive for sleep disturbance. The patient is nervous/anxious. All other systems reviewed and are negative. HISTORY OF PRESENT ILLNESS     HPI   Patient would like to discuss options of medications to help with sleep and to help with increased stress.       REVIEWED INFORMATION      No Known Allergies    Patient Active Problem List   Diagnosis    Carpal tunnel syndrome of right wrist    History of radial keratotomy    Insomnia    Keratitis sicca, bilateral (HCC)    Osteopenia    Sleep disturbance    SPK (superficial punctate keratitis), bilateral    Somatic dysfunction of hip region    Pain in both lower extremities    Gait abnormality    Weakness of both lower extremities    Pain of left calf    Numbness and tingling of right lower extremity    Numbness and tingling of left lower extremity    Unsteady gait when walking    Swelling of right extremity    Pain of right calf    Swelling of left lower extremity       Past Medical History:   Diagnosis Date    No known health problems     Spinal stenosis, lumbar        Past Surgical History:   Procedure Laterality Date    CHOLECYSTECTOMY      1990    COLONOSCOPY      2014, next one due in 2024        Social History     Socioeconomic History    Marital status:      Spouse name: Not on file    Number of children: Not on file    Years of education: Not on file    Highest education level: Not on file   Occupational History    Not on file   Tobacco Use    Smoking status: Never Smoker    Smokeless tobacco: Never Used   Vaping Use    Vaping Use: Never used   Substance and Sexual Activity    Alcohol use: Yes     Comment: socially    Drug use: Never    Sexual activity: Yes     Partners: Male   Other Topics Concern    Not on file   Social History Narrative    Not on file     Social Determinants of Health     Financial Resource Strain: Low Risk     Difficulty of Paying Living Expenses: Not hard at all   Food Insecurity: No Food Insecurity    Worried About Running Out of Food in the Last Year: Never true    [address] in the Last Year: Never true   Transportation Needs:     Lack of Transportation (Medical): Not on file    Lack of Transportation (Non-Medical):  Not on file   Physical Activity:     Days of Exercise per Week: Not on file    Minutes of Exercise per Session: Not on file   Stress:     Feeling of Stress : Not on file   Social Connections:     Frequency of Communication with Friends and Family: Not on file    Frequency of Social Gatherings with Friends and Family: Not on file    Attends Anabaptism Services: Not on file    Active Member of Clubs or Organizations: Not on file    Attends Club or Organization Meetings: Not on file    Marital Status: Not on file   Intimate Partner Violence:     Fear of Current or Ex-Partner: Not on file    Emotionally Abused: Not on file    Physically Abused: Not on file    Sexually Abused: Not on file   Housing Stability:     Unable to Pay for Housing in the Last Year: Not on file    Number of Places Lived in the Last Year: Not on file    Unstable Housing in the Last Year: Not on file        Family History   Problem Relation Age of Onset    No Known Problems Mother     No Known Problems Father        PHYSICAL EXAM     Vitals and exam limited due to virtual visit   Physical Exam  Nursing note reviewed. Constitutional:       Appearance: Normal appearance. HENT:      Head: Normocephalic and atraumatic. Right Ear: External ear normal.      Left Ear: External ear normal.      Nose: Nose normal.   Eyes:      Extraocular Movements: Extraocular movements intact. Conjunctiva/sclera: Conjunctivae normal.   Pulmonary:      Comments: Able to talk without shortness of breath or difficulty  Neurological:      Mental Status: She is alert and oriented to person, place, and time. Psychiatric:         Mood and Affect: Mood normal.         Behavior: Behavior normal.         Thought Content: Thought content normal.         Judgment: Judgment normal.         ASSESSMENT/PLAN     1. Insomnia, unspecified type  - zolpidem (AMBIEN) 10 MG tablet; Take 1 tablet by mouth nightly as needed for Sleep for up to 90 days. Dispense: 30 tablet; Refill: 2    2. Anxiety    Anxiety with sleep insomnia - discussed options, will start zoloft 50 mg 1/2 tab daily and increase to 50 mg daily if needed, call with update. Will also try ambien short term for insomnia - to start with 1/2 tablet Ambien 10 mg nightly prn sleep.         COMMUNICATION:       Electronically signed by Una Monk MD on 6/7/2022 at 10:26 AM

## 2022-06-17 ENCOUNTER — TELEPHONE (OUTPATIENT)
Dept: FAMILY MEDICINE CLINIC | Age: 57
End: 2022-06-17

## 2022-06-17 NOTE — TELEPHONE ENCOUNTER
----- Message from Rosita Phillip sent at 6/17/2022 12:48 PM EDT -----  Subject: Appointment Request    Reason for Call: Routine Physical Exam    QUESTIONS  Type of Appointment? New Patient/New to Provider  Reason for appointment request? Available appointments did not meet   patient need  Additional Information for Provider? patient requesting to establish care   with Dr. Berhane Pemberton, she is a former patient of Dr. Dago Krishna, and would to   schedule her annual physical as well, please advise  ---------------------------------------------------------------------------  --------------  CALL BACK INFO  What is the best way for the office to contact you? OK to leave message on   voicemail  Preferred Call Back Phone Number? [phone]  ---------------------------------------------------------------------------  --------------  SCRIPT ANSWERS  Relationship to Patient? Self  Specialty Confirmation? Primary Care  (Is the patient requesting their annual physical and does not need PAP or   AWV per above?)? Yes   Have you been diagnosed with COVID-19 in the past 10 days? No  (Service Expert  click yes below to proceed with [employer] As Usual   Scheduling)?  Yes

## 2022-11-10 ENCOUNTER — OFFICE VISIT (OUTPATIENT)
Dept: FAMILY MEDICINE CLINIC | Age: 57
End: 2022-11-10
Payer: COMMERCIAL

## 2022-11-10 VITALS
DIASTOLIC BLOOD PRESSURE: 72 MMHG | BODY MASS INDEX: 22.34 KG/M2 | TEMPERATURE: 97.2 F | HEART RATE: 60 BPM | HEIGHT: 66 IN | RESPIRATION RATE: 16 BRPM | SYSTOLIC BLOOD PRESSURE: 106 MMHG | WEIGHT: 139 LBS | OXYGEN SATURATION: 99 %

## 2022-11-10 DIAGNOSIS — F41.9 ANXIETY: ICD-10-CM

## 2022-11-10 DIAGNOSIS — Z00.00 PREVENTATIVE HEALTH CARE: Primary | ICD-10-CM

## 2022-11-10 DIAGNOSIS — G47.00 INSOMNIA, UNSPECIFIED TYPE: ICD-10-CM

## 2022-11-10 DIAGNOSIS — M85.88 OSTEOPENIA OF LUMBAR SPINE: ICD-10-CM

## 2022-11-10 DIAGNOSIS — Z12.11 COLON CANCER SCREENING: ICD-10-CM

## 2022-11-10 PROBLEM — M79.662 PAIN OF LEFT CALF: Status: RESOLVED | Noted: 2020-12-03 | Resolved: 2022-11-10

## 2022-11-10 PROBLEM — M79.661 PAIN OF RIGHT CALF: Status: RESOLVED | Noted: 2020-12-03 | Resolved: 2022-11-10

## 2022-11-10 PROBLEM — R20.2 NUMBNESS AND TINGLING OF LEFT LOWER EXTREMITY: Status: RESOLVED | Noted: 2020-12-03 | Resolved: 2022-11-10

## 2022-11-10 PROBLEM — M79.89 SWELLING OF LEFT LOWER EXTREMITY: Status: RESOLVED | Noted: 2020-12-03 | Resolved: 2022-11-10

## 2022-11-10 PROBLEM — Z98.890 HISTORY OF RADIAL KERATOTOMY: Status: RESOLVED | Noted: 2019-12-05 | Resolved: 2022-11-10

## 2022-11-10 PROBLEM — R20.0 NUMBNESS AND TINGLING OF LEFT LOWER EXTREMITY: Status: RESOLVED | Noted: 2020-12-03 | Resolved: 2022-11-10

## 2022-11-10 PROBLEM — R29.898 WEAKNESS OF BOTH LOWER EXTREMITIES: Status: RESOLVED | Noted: 2020-11-09 | Resolved: 2022-11-10

## 2022-11-10 PROBLEM — M79.604 PAIN IN BOTH LOWER EXTREMITIES: Status: RESOLVED | Noted: 2020-11-09 | Resolved: 2022-11-10

## 2022-11-10 PROBLEM — R20.2 NUMBNESS AND TINGLING OF RIGHT LOWER EXTREMITY: Status: RESOLVED | Noted: 2020-12-03 | Resolved: 2022-11-10

## 2022-11-10 PROBLEM — R26.81 UNSTEADY GAIT WHEN WALKING: Status: RESOLVED | Noted: 2020-12-03 | Resolved: 2022-11-10

## 2022-11-10 PROBLEM — R20.0 NUMBNESS AND TINGLING OF RIGHT LOWER EXTREMITY: Status: RESOLVED | Noted: 2020-12-03 | Resolved: 2022-11-10

## 2022-11-10 PROBLEM — M79.605 PAIN IN BOTH LOWER EXTREMITIES: Status: RESOLVED | Noted: 2020-11-09 | Resolved: 2022-11-10

## 2022-11-10 PROBLEM — M48.061 DEGENERATIVE LUMBAR SPINAL STENOSIS: Status: ACTIVE | Noted: 2021-10-20

## 2022-11-10 PROBLEM — M79.89 SWELLING OF RIGHT EXTREMITY: Status: RESOLVED | Noted: 2020-12-03 | Resolved: 2022-11-10

## 2022-11-10 PROCEDURE — 99396 PREV VISIT EST AGE 40-64: CPT | Performed by: NURSE PRACTITIONER

## 2022-11-10 SDOH — ECONOMIC STABILITY: FOOD INSECURITY: WITHIN THE PAST 12 MONTHS, THE FOOD YOU BOUGHT JUST DIDN'T LAST AND YOU DIDN'T HAVE MONEY TO GET MORE.: NEVER TRUE

## 2022-11-10 SDOH — ECONOMIC STABILITY: FOOD INSECURITY: WITHIN THE PAST 12 MONTHS, YOU WORRIED THAT YOUR FOOD WOULD RUN OUT BEFORE YOU GOT MONEY TO BUY MORE.: NEVER TRUE

## 2022-11-10 ASSESSMENT — ENCOUNTER SYMPTOMS
RHINORRHEA: 0
BACK PAIN: 0
DIARRHEA: 0
CONSTIPATION: 0
CHEST TIGHTNESS: 0
NAUSEA: 0
COUGH: 0
ABDOMINAL DISTENTION: 0
SHORTNESS OF BREATH: 0
COLOR CHANGE: 0
ABDOMINAL PAIN: 0
SORE THROAT: 0

## 2022-11-10 ASSESSMENT — SOCIAL DETERMINANTS OF HEALTH (SDOH): HOW HARD IS IT FOR YOU TO PAY FOR THE VERY BASICS LIKE FOOD, HOUSING, MEDICAL CARE, AND HEATING?: NOT HARD AT ALL

## 2022-11-10 NOTE — PATIENT INSTRUCTIONS
Health Maintenance Recommendations  Exercise   I generally recommend that people of all ages try to get 150 minutes of physical activity per week and it doesnt matter how this totals up, in other words 30 minutes 5 days per week is as good as 50 minutes 3 days a week and so on. The level of activity should be such that it is able to get your heart rate up to 100 or more, for example a brisk walk should achieve this rate. Dietary Recommendations  In terms of diet, I generally recommend trying to eat a healthy well balanced diet full of fruits and vegetables. Avoid carbonated drinks and fruit juices and limit your alcohol use. Avoid processed foods wherever possible (anything that comes in a can or a box) which can be achieved by sticking to the outside walls of the grocery store where generally you will find fresh fruits/vegetables, meats, dairy, and frozen foods. Try to avoid starches in the diet where possible and minimize bread, rice, potatoes, and pasta in the diet. Specifically try to avoid gluten, which even in people that dont have a storm allergy, causes havoc in the small intestine and alters absorption of nutrients which can in turn lead to obesity. Sleep  Try to achieve a regular sleep schedule, waking and laying down at the same time each night. Most people need 7 hours per night plus or minus 2 hours. You will know that youre getting enough because you will wake feeling refreshed and not need to sleep in to catch up on weekends. Skin Care  Make sure that you dont neglect your skin. Play it safe in the sun. Use a sunblock on all of your exposed skin. The sunblock should be broad spectrum and water resistant. I do recommend an SPF 30 or higher sun screen any time that you plan to be in the sun for more than 20 minutes, even in the winter or on cloudy days (keep in mind that UV light penetrates clouds and can cause burns even on cloudy days).    Apply 20 to 30 minutes before going out in the sun. Reapply sunblock every 2 hours and after swimming or sweating. Nkechi Martinez can also damage your skin on cool, windy days. Clouds and fog do not filter UV light. Make sure you reapply sunblock every 2 hours. Avoid the sun in the middle of the day, between 10 AM to 4 PM. Your unprotected skin can be damaged in 15 minutes with direct sun exposure. Personal Health  If you smoke, STOP. There are many resources available to help you successfully quit. If you are sexually active, always practice safe sex and wear a condom. See a dentist every 6 months. See an eye doctor regularly. Always wear a seat belt while in car. Get a flu vaccine annually. Get a tetanus booster vaccine every 10 years. Psychosocial Health  Finally, make sure that you always have something to look forward to whether this is a vacation, a special event, or just a weekend off work. Having something to look forward to helps to maintain positive focus and is good for mental health.

## 2022-11-10 NOTE — PROGRESS NOTES
[facility], APRN-CNP  [facility]  [address] [address]  [facility] [address]  Dept: [phone]  Dept Fax: [phone]     Patient ID: Yonas Robles is a 62 y.o. female. HPI    Yonas Robles is a 62 y.o. female Established patient who presents to the office today for a first visit and to establish a relationship with a new primary care provider. Previous PCP: Shannan Roque    Today, the patient complains of none. - she taking zoloft 25 mg but feels like it might be better at 50 mg, she did try 50 mg for about a week and felt like she was forgetful at her part time job, but wasn't sure if it was due to the zoloft or not. She will be leaving for [facility] soon for the winter  - had back surgery so still has trouble with being able to workout and wearing certain shoes. Preventative care, female:  Last colonoscopy: cologuard 2019  Last mammogram: 6/2022  Dexa Scan: 2021 osteopenia   Last PAP: 2019, goes soon Dr. Rogerio Rondon  Nicotine use: never  Alcohol use: yes, socially   Drug use: never  Dental exam: every 3 months  Eye exam: 1 year ago, RK surgery, uses cheaters    Specialists:  Ob/GYN     Previous office notes, labs, imaging and hospital records were reviewed prior to and during encounter. The patient's past medical, surgical, social, and family history as well as her current medications and allergies were reviewed as documented in today's encounter by TEAGAN Meyer.       Current Outpatient Medications on File Prior to Visit   Medication Sig Dispense Refill    sertraline (ZOLOFT) 50 MG tablet Take 1 tablet by mouth daily 30 tablet 7    Multiple Vitamins-Minerals (MULTIVITAMIN ADULT PO) Take 1 capsule by mouth daily      calcium-vitamin D (OSCAL-500) 500-200 MG-UNIT per tablet Take 1 tablet by mouth daily      B Complex Vitamins (B COMPLEX 1 PO) Take 1 capsule by mouth daily      Ascorbic Acid (VITAMIN C) 250 MG tablet Take 250 mg by mouth daily       No current facility-administered medications on file prior to visit. Subjective:     Review of Systems   Constitutional:  Negative for activity change, fatigue and fever. HENT:  Negative for congestion, ear pain, rhinorrhea and sore throat. Respiratory:  Negative for cough, chest tightness and shortness of breath. Cardiovascular:  Negative for chest pain and palpitations. Gastrointestinal:  Negative for abdominal distention, abdominal pain, constipation, diarrhea and nausea. Endocrine: Negative for polydipsia, polyphagia and polyuria. Genitourinary:  Negative for difficulty urinating and dysuria. Musculoskeletal:  Negative for arthralgias, back pain and myalgias. Skin:  Negative for color change and rash. Neurological:  Negative for dizziness, weakness, light-headedness and headaches. Hematological:  Negative for adenopathy. Psychiatric/Behavioral:  Positive for sleep disturbance (averages 6 hours). Negative for agitation and behavioral problems. The patient is not nervous/anxious. Vitals:    11/10/22 0940   BP: 106/72   Pulse: 60   Resp: 16   Temp: 97.2 °F (36.2 °C)   SpO2: 99%     Objective:     Physical Exam  Vitals reviewed. Constitutional:       General: She is not in acute distress. Appearance: Normal appearance. HENT:      Head: Normocephalic and atraumatic. Right Ear: External ear normal.      Left Ear: External ear normal.      Nose: Nose normal.      Mouth/Throat:      Mouth: Mucous membranes are moist.      Pharynx: No oropharyngeal exudate or posterior oropharyngeal erythema. Eyes:      Extraocular Movements: Extraocular movements intact. Conjunctiva/sclera: Conjunctivae normal.      Pupils: Pupils are equal, round, and reactive to light. Cardiovascular:      Rate and Rhythm: Normal rate and regular rhythm. Pulses: Normal pulses. Heart sounds: Normal heart sounds. No murmur heard.   Pulmonary:      Effort: Pulmonary effort is normal. No respiratory distress. Breath sounds: Normal breath sounds. No wheezing or rales. Abdominal:      General: Bowel sounds are normal. There is no distension. Palpations: Abdomen is soft. Tenderness: There is no abdominal tenderness. Musculoskeletal:         General: Normal range of motion. Cervical back: Normal range of motion. Right lower leg: No edema. Left lower leg: No edema. Lymphadenopathy:      Head:      Right side of head: No submental, submandibular, tonsillar, preauricular, posterior auricular or occipital adenopathy. Left side of head: No submental, submandibular, tonsillar, preauricular, posterior auricular or occipital adenopathy. Cervical: No cervical adenopathy. Skin:     General: Skin is warm and dry. Neurological:      General: No focal deficit present. Mental Status: She is alert and oriented to person, place, and time. Deep Tendon Reflexes: Reflexes normal.   Psychiatric:         Mood and Affect: Mood is not anxious (stable) or depressed. Behavior: Behavior normal. Behavior is cooperative. Assessment:      Diagnosis Orders   1. Preventative health care  CBC    Hemoglobin A1C    Lipid Panel      2. Colon cancer screening  Fecal DNA Colorectal cancer screening (Cologuard)      3. Osteopenia of lumbar spine        4. Insomnia, unspecified type        5. Anxiety          Plan:     Colon cancer screening  - cologuard ordered  - Fecal DNA Colorectal cancer screening (Cologuard)    Osteopenia of lumbar spine  - continue Calcium and Vit D. Increase exercise. Will recheck DEXA in 2 years    Insomnia, unspecified type  - Stable:  - discussed trying hydroxyzine if she has worsening sleep disturbance. - Discussed sleep hygiene, limiting caffeine after noon and decreasing naps during the day.     Anxiety  - Stable: Medication re-filled as needed, con't medications as prescribed, con't current tx plan  - Continue with Zoloft as previously prescribed. - discussed slow titration for the Zoloft, starting by increasing to 50 mg every other day and then daily.   - Pt encouraged to deep breath and relax through anxious moments   - Offered reassurance and allowed to ventilate feelings and ask questions.   - Non-pharmological coping methods discussed. Preventative health care  - We did discuss the recommended preventative screening guidelines including routine dental and eye exams.   - Detailed education was provided on the patient's after visit summary.  - Will order above noted labs and discuss them at follow up visit. - Will cont to follow with  OB/GYN as instructed for routine PAP. - Annual mammograms as recommended. Return in about 6 months (around 5/10/2023) for medication check, anxiety. - pt verbalized understanding plan of care. Medications, labs, diagnostic studies, consultations and follow-up as documented in this encounter. Rest of systems unchanged, continue current treatments  On this date 11/10/2022 I have spent 30 minutes reviewing previous notes, test results and face to face with the patient discussing the diagnosis and importance of compliance with the treatment plan as well as documenting on the day of the visit. Magalys Malin.  APRN-CNP

## 2022-11-17 LAB
AVERAGE GLUCOSE: 105
BASOPHILS ABSOLUTE: ABNORMAL
BASOPHILS RELATIVE PERCENT: ABNORMAL
CHOLESTEROL, TOTAL: 211 MG/DL
CHOLESTEROL/HDL RATIO: 2.8
EOSINOPHILS ABSOLUTE: ABNORMAL
EOSINOPHILS RELATIVE PERCENT: ABNORMAL
HBA1C MFR BLD: 5.3 %
HCT VFR BLD CALC: 40.6 % (ref 36–46)
HDLC SERPL-MCNC: 76 MG/DL (ref 35–70)
HEMOGLOBIN: 13.5 G/DL (ref 12–16)
LDL CHOLESTEROL CALCULATED: 126 MG/DL (ref 0–160)
LYMPHOCYTES ABSOLUTE: ABNORMAL
LYMPHOCYTES RELATIVE PERCENT: ABNORMAL
MCH RBC QN AUTO: 30.6 PG
MCHC RBC AUTO-ENTMCNC: 33.4 G/DL
MCV RBC AUTO: 92 FL
MONOCYTES ABSOLUTE: ABNORMAL
MONOCYTES RELATIVE PERCENT: ABNORMAL
NEUTROPHILS ABSOLUTE: ABNORMAL
NEUTROPHILS RELATIVE PERCENT: ABNORMAL
NONHDLC SERPL-MCNC: ABNORMAL MG/DL
PLATELET # BLD: 227 K/ΜL
PMV BLD AUTO: 7 FL
RBC # BLD: 4.42 10^6/ΜL
TRIGL SERPL-MCNC: 43 MG/DL
VLDLC SERPL CALC-MCNC: 9 MG/DL
WBC # BLD: 3.6 10^3/ML

## 2022-11-18 DIAGNOSIS — D72.819 LEUKOPENIA, UNSPECIFIED TYPE: Primary | ICD-10-CM

## 2022-11-18 DIAGNOSIS — Z00.00 PREVENTATIVE HEALTH CARE: ICD-10-CM

## 2022-11-22 ENCOUNTER — TELEPHONE (OUTPATIENT)
Dept: FAMILY MEDICINE CLINIC | Age: 57
End: 2022-11-22

## 2022-11-22 NOTE — TELEPHONE ENCOUNTER
Pt called in and states she received a call regarding low white blood count. She said she has always had normal count and she has not been sick or feeling off. She donated blood 2-3 weeks prior to her lab draw. She is wondering if that could have had something to do with it. She is wondering if she should get another blood draw to make sure. She said if there are any questions and she cannot answer her phone to please leave her a voicemail.

## 2022-11-23 LAB — NONINV COLON CA DNA+OCC BLD SCRN STL QL: NEGATIVE

## 2022-12-20 ENCOUNTER — HOSPITAL ENCOUNTER (OUTPATIENT)
Age: 57
Discharge: HOME | End: 2022-12-20
Payer: COMMERCIAL

## 2022-12-20 DIAGNOSIS — D72.819 LEUKOPENIA, UNSPECIFIED TYPE: ICD-10-CM

## 2022-12-20 LAB
ABSOLUTE EOS #: 0.07 K/UL (ref 0–0.44)
ABSOLUTE IMMATURE GRANULOCYTE: <0.03 K/UL (ref 0–0.3)
ABSOLUTE LYMPH #: 2.51 K/UL (ref 1.1–3.7)
ABSOLUTE MONO #: 0.41 K/UL (ref 0.1–1.2)
BASOPHILS # BLD: 1 % (ref 0–2)
BASOPHILS ABSOLUTE: 0.04 K/UL (ref 0–0.2)
EOSINOPHILS RELATIVE PERCENT: 1 % (ref 1–4)
HCT VFR BLD CALC: 40.1 % (ref 36.3–47.1)
HEMOGLOBIN: 13.4 G/DL (ref 11.9–15.1)
IMMATURE GRANULOCYTES: 0 %
LYMPHOCYTES # BLD: 35 % (ref 24–43)
MCH RBC QN AUTO: 30.6 PG (ref 25.2–33.5)
MCHC RBC AUTO-ENTMCNC: 33.4 G/DL (ref 28.4–34.8)
MCV RBC AUTO: 91.6 FL (ref 82.6–102.9)
MONOCYTES # BLD: 6 % (ref 3–12)
NRBC AUTOMATED: 0 PER 100 WBC
PDW BLD-RTO: 13 % (ref 11.8–14.4)
PLATELET # BLD: 221 K/UL (ref 138–453)
PMV BLD AUTO: 9.5 FL (ref 8.1–13.5)
RBC # BLD: 4.38 M/UL (ref 3.95–5.11)
SEG NEUTROPHILS: 57 % (ref 36–65)
SEGMENTED NEUTROPHILS ABSOLUTE COUNT: 4.11 K/UL (ref 1.5–8.1)
WBC # BLD: 7.2 K/UL (ref 3.5–11.3)

## 2022-12-20 PROCEDURE — 85025 COMPLETE CBC W/AUTO DIFF WBC: CPT

## 2022-12-20 PROCEDURE — 36415 COLL VENOUS BLD VENIPUNCTURE: CPT

## 2023-05-16 SDOH — ECONOMIC STABILITY: INCOME INSECURITY: HOW HARD IS IT FOR YOU TO PAY FOR THE VERY BASICS LIKE FOOD, HOUSING, MEDICAL CARE, AND HEATING?: NOT HARD AT ALL

## 2023-05-16 SDOH — ECONOMIC STABILITY: TRANSPORTATION INSECURITY
IN THE PAST 12 MONTHS, HAS LACK OF TRANSPORTATION KEPT YOU FROM MEETINGS, WORK, OR FROM GETTING THINGS NEEDED FOR DAILY LIVING?: NO

## 2023-05-16 SDOH — ECONOMIC STABILITY: HOUSING INSECURITY
IN THE LAST 12 MONTHS, WAS THERE A TIME WHEN YOU DID NOT HAVE A STEADY PLACE TO SLEEP OR SLEPT IN A SHELTER (INCLUDING NOW)?: NO

## 2023-05-16 SDOH — ECONOMIC STABILITY: FOOD INSECURITY: WITHIN THE PAST 12 MONTHS, YOU WORRIED THAT YOUR FOOD WOULD RUN OUT BEFORE YOU GOT MONEY TO BUY MORE.: NEVER TRUE

## 2023-05-16 SDOH — ECONOMIC STABILITY: FOOD INSECURITY: WITHIN THE PAST 12 MONTHS, THE FOOD YOU BOUGHT JUST DIDN'T LAST AND YOU DIDN'T HAVE MONEY TO GET MORE.: NEVER TRUE

## 2023-05-19 ENCOUNTER — OFFICE VISIT (OUTPATIENT)
Dept: FAMILY MEDICINE CLINIC | Age: 58
End: 2023-05-19
Payer: COMMERCIAL

## 2023-05-19 VITALS
BODY MASS INDEX: 22.6 KG/M2 | RESPIRATION RATE: 16 BRPM | HEART RATE: 66 BPM | DIASTOLIC BLOOD PRESSURE: 85 MMHG | SYSTOLIC BLOOD PRESSURE: 127 MMHG | OXYGEN SATURATION: 99 % | WEIGHT: 140 LBS

## 2023-05-19 DIAGNOSIS — F41.9 ANXIETY: Primary | ICD-10-CM

## 2023-05-19 DIAGNOSIS — Z00.00 PREVENTATIVE HEALTH CARE: ICD-10-CM

## 2023-05-19 PROCEDURE — 99213 OFFICE O/P EST LOW 20 MIN: CPT | Performed by: NURSE PRACTITIONER

## 2023-05-19 RX ORDER — CITALOPRAM 10 MG/1
10 TABLET ORAL DAILY
Qty: 30 TABLET | Refills: 0 | Status: SHIPPED | OUTPATIENT
Start: 2023-05-19

## 2023-05-19 ASSESSMENT — ENCOUNTER SYMPTOMS
NAUSEA: 0
CONSTIPATION: 0
COLOR CHANGE: 0
COUGH: 0
SORE THROAT: 0
DIARRHEA: 0
RHINORRHEA: 0
ABDOMINAL PAIN: 0
BACK PAIN: 0
CHEST TIGHTNESS: 0
SHORTNESS OF BREATH: 0
ABDOMINAL DISTENTION: 0

## 2023-05-19 NOTE — PROGRESS NOTES
General: Skin is warm and dry. Neurological:      General: No focal deficit present. Mental Status: She is alert and oriented to person, place, and time. Deep Tendon Reflexes: Reflexes normal.   Psychiatric:         Mood and Affect: Mood is anxious (stable). Behavior: Behavior normal. Behavior is cooperative. Assessment:      Diagnosis Orders   1. Anxiety  citalopram (CELEXA) 10 MG tablet      2. Preventative health care  CBC    Comprehensive Metabolic Panel    Hemoglobin A1C    Lipid Panel    TSH            Plan:      Anxiety  - discussed different options with pt including changing dose of Zoloft, celexa and buspar. Pt was interested in starting celexa. Medication and side effects discussed. - will titrate off zoloft as discussed  - take 1/2 tablet daily for 1 week. - take the zoloft every other day for 1 weeks  - take it every 3rd day for 2 weeks and then stop  - Pt encouraged to deep breath and relax through anxious moments   - Offered reassurance and allowed to ventilate feelings and ask questions.   - Non-pharmological coping methods discussed. Preventative health care  Labs due in 6 months with physical, orders placed. - pt verbalized understanding plan of care. Medications, labs, diagnostic studies, consultations and follow-up as documented in this encounter. Rest of systems unchanged, continue current treatments    On this date 5/19/2023 I have spent 20 minutes reviewing previous notes, test results and face to face with the patient discussing the diagnosis and importance of compliance with the treatment plan as well as documenting on the day of the visit.     LIAT Torres-CNP

## 2023-06-14 DIAGNOSIS — F41.9 ANXIETY: ICD-10-CM

## 2023-06-14 RX ORDER — CITALOPRAM HYDROBROMIDE 10 MG/1
TABLET ORAL
Qty: 30 TABLET | Refills: 3 | Status: SHIPPED | OUTPATIENT
Start: 2023-06-14

## 2023-10-16 ENCOUNTER — TELEPHONE (OUTPATIENT)
Dept: FAMILY MEDICINE CLINIC | Age: 58
End: 2023-10-16

## 2023-10-16 NOTE — TELEPHONE ENCOUNTER
----- Message from Sierra Yeni sent at 10/16/2023  9:38 AM EDT -----  Subject: Message to Provider    QUESTIONS  Information for Provider? Pt requesting lab orders to be faxed to   [phone] Please advise.   ---------------------------------------------------------------------------  --------------  Regan Benavidez INFO  [phone]; OK to leave message on voicemail  ---------------------------------------------------------------------------  --------------  SCRIPT ANSWERS  Relationship to Patient?  Self

## 2023-11-10 ASSESSMENT — PATIENT HEALTH QUESTIONNAIRE - PHQ9
SUM OF ALL RESPONSES TO PHQ QUESTIONS 1-9: 0
1. LITTLE INTEREST OR PLEASURE IN DOING THINGS: NOT AT ALL
2. FEELING DOWN, DEPRESSED OR HOPELESS: NOT AT ALL
SUM OF ALL RESPONSES TO PHQ9 QUESTIONS 1 & 2: 0
1. LITTLE INTEREST OR PLEASURE IN DOING THINGS: 0
2. FEELING DOWN, DEPRESSED OR HOPELESS: 0
SUM OF ALL RESPONSES TO PHQ QUESTIONS 1-9: 0
SUM OF ALL RESPONSES TO PHQ9 QUESTIONS 1 & 2: 0

## 2023-11-13 ENCOUNTER — OFFICE VISIT (OUTPATIENT)
Dept: FAMILY MEDICINE CLINIC | Age: 58
End: 2023-11-13
Payer: COMMERCIAL

## 2023-11-13 VITALS
TEMPERATURE: 97.4 F | HEIGHT: 66 IN | SYSTOLIC BLOOD PRESSURE: 116 MMHG | DIASTOLIC BLOOD PRESSURE: 76 MMHG | OXYGEN SATURATION: 98 % | RESPIRATION RATE: 16 BRPM | WEIGHT: 141 LBS | HEART RATE: 88 BPM | BODY MASS INDEX: 22.66 KG/M2

## 2023-11-13 DIAGNOSIS — F41.9 ANXIETY: ICD-10-CM

## 2023-11-13 DIAGNOSIS — M77.01 GOLFER'S ELBOW, RIGHT: ICD-10-CM

## 2023-11-13 DIAGNOSIS — G47.00 INSOMNIA, UNSPECIFIED TYPE: ICD-10-CM

## 2023-11-13 DIAGNOSIS — M85.88 OSTEOPENIA OF LUMBAR SPINE: ICD-10-CM

## 2023-11-13 DIAGNOSIS — Z00.00 PREVENTATIVE HEALTH CARE: Primary | ICD-10-CM

## 2023-11-13 PROCEDURE — 99396 PREV VISIT EST AGE 40-64: CPT | Performed by: NURSE PRACTITIONER

## 2023-11-13 RX ORDER — PREDNISONE 20 MG/1
TABLET ORAL
Qty: 17 TABLET | Refills: 0 | Status: SHIPPED | OUTPATIENT
Start: 2023-11-13 | End: 2023-11-23

## 2023-11-13 ASSESSMENT — ENCOUNTER SYMPTOMS
NAUSEA: 0
DIARRHEA: 0
COLOR CHANGE: 0
CHEST TIGHTNESS: 0
BACK PAIN: 0
ABDOMINAL DISTENTION: 0
COUGH: 0
SORE THROAT: 0
RHINORRHEA: 0
SHORTNESS OF BREATH: 0
ABDOMINAL PAIN: 0
CONSTIPATION: 0

## 2023-11-13 NOTE — PROGRESS NOTES
There is no abdominal tenderness. Musculoskeletal:         General: Normal range of motion. Cervical back: Normal range of motion. Right lower leg: No edema. Left lower leg: No edema. Lymphadenopathy:      Head:      Right side of head: No submental, submandibular, tonsillar, preauricular, posterior auricular or occipital adenopathy. Left side of head: No submental, submandibular, tonsillar, preauricular, posterior auricular or occipital adenopathy. Cervical: No cervical adenopathy. Skin:     General: Skin is warm and dry. Neurological:      General: No focal deficit present. Mental Status: She is alert and oriented to person, place, and time. Cranial Nerves: Cranial nerves 2-12 are intact. Sensory: Sensation is intact. Motor: Motor function is intact. Coordination: Coordination is intact. Gait: Gait is intact. Deep Tendon Reflexes: Reflexes normal.   Psychiatric:         Attention and Perception: Attention and perception normal.         Mood and Affect: Mood and affect normal.         Speech: Speech normal.         Behavior: Behavior normal. Behavior is cooperative. Thought Content: Thought content normal.         Cognition and Memory: Cognition and memory normal.         Judgment: Judgment normal.       Assessment:      Diagnosis Orders   1. Preventative health care        2. Osteopenia of lumbar spine        3. Insomnia, unspecified type        4. Anxiety        5. Golfer's elbow, right  predniSONE (DELTASONE) 20 MG tablet        Plan:     - labs reviewed and discussed with pt. Osteopenia of lumbar spine  - continue Calcium and Vit D. Increase exercise.    - due to check 12/2023, usually ordered through ob/gyn. Insomnia, unspecified type  - Stable: continue current tx.   - Discussed sleep hygiene, limiting caffeine after noon and decreasing naps during the day.      Anxiety  - Stable: con't current tx plan  - Pt encouraged to deep

## 2023-12-13 PROBLEM — Z00.00 PREVENTATIVE HEALTH CARE: Status: RESOLVED | Noted: 2021-09-30 | Resolved: 2023-12-13

## 2024-10-22 ENCOUNTER — TELEPHONE (OUTPATIENT)
Dept: FAMILY MEDICINE CLINIC | Age: 59
End: 2024-10-22

## 2024-10-22 DIAGNOSIS — Z00.00 PREVENTATIVE HEALTH CARE: Primary | ICD-10-CM

## 2024-10-22 NOTE — TELEPHONE ENCOUNTER
Pt is asking if her lab order from 11/13/23 can be re ordered for her to get done before her next appt on 11/18/24. She will not be able to get the labs done before the order expires. Please send back to me so I can notify the pt. She would like this order to be sent to [facility].

## 2024-11-15 ASSESSMENT — PATIENT HEALTH QUESTIONNAIRE - PHQ9
SUM OF ALL RESPONSES TO PHQ QUESTIONS 1-9: 0
SUM OF ALL RESPONSES TO PHQ QUESTIONS 1-9: 0
2. FEELING DOWN, DEPRESSED OR HOPELESS: NOT AT ALL
SUM OF ALL RESPONSES TO PHQ QUESTIONS 1-9: 0
1. LITTLE INTEREST OR PLEASURE IN DOING THINGS: NOT AT ALL
2. FEELING DOWN, DEPRESSED OR HOPELESS: NOT AT ALL
SUM OF ALL RESPONSES TO PHQ QUESTIONS 1-9: 0
SUM OF ALL RESPONSES TO PHQ9 QUESTIONS 1 & 2: 0
SUM OF ALL RESPONSES TO PHQ9 QUESTIONS 1 & 2: 0
1. LITTLE INTEREST OR PLEASURE IN DOING THINGS: NOT AT ALL

## 2024-11-18 ENCOUNTER — OFFICE VISIT (OUTPATIENT)
Dept: FAMILY MEDICINE CLINIC | Age: 59
End: 2024-11-18
Payer: COMMERCIAL

## 2024-11-18 VITALS
RESPIRATION RATE: 16 BRPM | WEIGHT: 139 LBS | DIASTOLIC BLOOD PRESSURE: 82 MMHG | BODY MASS INDEX: 22.34 KG/M2 | HEART RATE: 67 BPM | HEIGHT: 66 IN | SYSTOLIC BLOOD PRESSURE: 112 MMHG | OXYGEN SATURATION: 98 % | TEMPERATURE: 97.6 F

## 2024-11-18 DIAGNOSIS — E78.00 ELEVATED CHOLESTEROL: ICD-10-CM

## 2024-11-18 DIAGNOSIS — G47.00 INSOMNIA, UNSPECIFIED TYPE: ICD-10-CM

## 2024-11-18 DIAGNOSIS — F41.9 ANXIETY: ICD-10-CM

## 2024-11-18 DIAGNOSIS — Z00.00 ANNUAL PHYSICAL EXAM: Primary | ICD-10-CM

## 2024-11-18 DIAGNOSIS — M85.88 OSTEOPENIA OF LUMBAR SPINE: ICD-10-CM

## 2024-11-18 PROCEDURE — 99396 PREV VISIT EST AGE 40-64: CPT | Performed by: NURSE PRACTITIONER

## 2024-11-18 SDOH — ECONOMIC STABILITY: FOOD INSECURITY: WITHIN THE PAST 12 MONTHS, YOU WORRIED THAT YOUR FOOD WOULD RUN OUT BEFORE YOU GOT MONEY TO BUY MORE.: NEVER TRUE

## 2024-11-18 SDOH — ECONOMIC STABILITY: FOOD INSECURITY: WITHIN THE PAST 12 MONTHS, THE FOOD YOU BOUGHT JUST DIDN'T LAST AND YOU DIDN'T HAVE MONEY TO GET MORE.: NEVER TRUE

## 2024-11-18 SDOH — ECONOMIC STABILITY: INCOME INSECURITY: HOW HARD IS IT FOR YOU TO PAY FOR THE VERY BASICS LIKE FOOD, HOUSING, MEDICAL CARE, AND HEATING?: NOT HARD AT ALL

## 2024-11-18 ASSESSMENT — ENCOUNTER SYMPTOMS
BACK PAIN: 0
SHORTNESS OF BREATH: 0
RHINORRHEA: 0
CONSTIPATION: 0
DIARRHEA: 0
ABDOMINAL DISTENTION: 0
CHEST TIGHTNESS: 0
COLOR CHANGE: 0
COUGH: 0
NAUSEA: 0
ABDOMINAL PAIN: 0
SORE THROAT: 0

## 2024-11-18 NOTE — PROGRESS NOTES
Janel Reyez, LIAT-CNP  [facility]  [address], [address]  [facility] [id number]  Dept: [phone]  Dept Fax: [phone]    Patient ID: Alyce Keen is a 59 y.o. female Established patient.    HPI    Presents for regular check-up and review of labs. Today, patient complains of none .  Pt denies any fever or chills.  Pt today denies any HA, chest pain, or SOB.  Pt denies any N/V/D/C or abdominal pain.    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     Preventative care, female:  Last PAP:Dr. Damon   Last mammogram:  7/2024  Last colonoscopy: cologuard 2022, colonoscopy sept. 2024 Dr. Hutchinson  Flu vaccine: 2024 at [facility] in [place]   Nicotine use: never  Alcohol use: rarely  Drug use: never  Last dental appointment: today  Last optometry appointment:  a while ago     Previous office notes, labs, imaging and hospital records were reviewed prior to and during encounter.    Otherwise pt doing well on current tx and no other concerns today.     The patient's past medical, surgical, social, and family history as well as his current medications and allergies were reviewed as documented in today's encounter.      Current Outpatient Medications on File Prior to Visit   Medication Sig Dispense Refill    Multiple Vitamins-Minerals (MULTIVITAMIN ADULT PO) Take 1 capsule by mouth daily      calcium-vitamin D (OSCAL-500) 500-200 MG-UNIT per tablet Take 1 tablet by mouth daily      B Complex Vitamins (B COMPLEX 1 PO) Take 1 capsule by mouth daily      Ascorbic Acid (VITAMIN C) 250 MG tablet Take 1 tablet by mouth daily       No current facility-administered medications on file prior to visit.        Subjective:     Review of Systems   Constitutional:  Negative for activity change, fatigue and fever.   HENT:  Negative for congestion, ear pain, rhinorrhea and sore throat.    Respiratory:  Negative for cough, chest

## 2024-11-18 NOTE — PATIENT INSTRUCTIONS
- red yeast rice 600 mg daily     Health Maintenance Recommendations  Exercise   I generally recommend that people of all ages try to get 150 minutes of physical activity per week and it doesn’t matter how this totals up, in other words 30 minutes 5 days per week is as good as 50 minutes 3 days a week and so on.    The level of activity should be such that it is able to get your heart rate up to 100 or more, for example a brisk walk should achieve this rate.   Dietary Recommendations  In terms of diet, I generally recommend trying to eat a healthy well balanced diet full of fruits and vegetables. Avoid carbonated drinks and fruit juices and limit your alcohol use.   Avoid processed foods wherever possible (anything that comes in a can or a box) which can be achieved by sticking to the outside walls of the grocery store where generally you will find fresh fruits/vegetables, meats, dairy, and frozen foods.    Try to avoid starches in the diet where possible and minimize bread, rice, potatoes, and pasta in the diet.  Specifically try to avoid gluten, which even in people that don’t have a storm allergy, causes havoc in the small intestine and alters absorption of nutrients which can in turn lead to obesity.   Sleep  Try to achieve a regular sleep schedule, waking and laying down at the same time each night.  Most people need 7 hours per night plus or minus 2 hours.    You will know that you’re getting enough because you will wake feeling refreshed and not need to sleep in to catch up on weekends.   Skin Care  Make sure that you don’t neglect your skin.    Play it safe in the sun. Use a sunblock on all of your exposed skin.   The sunblock should be broad spectrum and water resistant.    I do recommend an SPF 30 or higher sun screen any time that you plan to be in the sun for more than 20 minutes, even in the winter or on cloudy days (keep in mind that UV light penetrates clouds and can cause burns even on cloudy days).

## 2025-02-17 ENCOUNTER — TELEPHONE (OUTPATIENT)
Dept: FAMILY MEDICINE CLINIC | Age: 60
End: 2025-02-17

## 2025-02-17 DIAGNOSIS — G47.00 INSOMNIA, UNSPECIFIED TYPE: Primary | ICD-10-CM

## 2025-02-17 DIAGNOSIS — F41.9 ANXIETY: ICD-10-CM

## 2025-02-17 NOTE — TELEPHONE ENCOUNTER
Patient has received devastating news regarding her daughter and would not elaborate. She is currently in [place] at this time. She is working on getting back up here as soon as possible. She said that she can be seen in office as soon as 3/4 because that's when she will be back in [place].     She is not sleeping and is dealing with a lot of anxiety and depressive thoughts due to the current situation right now. She is waking up from sleep having a panic attack. She is wondering if something could be sent down to a pharmacy in [place] for the sleep and other issues, and touch base with an appointment in March.

## 2025-02-17 NOTE — TELEPHONE ENCOUNTER
Can you let her know that Janel is out of the office today. I will discuss this with her tomorrow when she returns.

## 2025-02-18 RX ORDER — HYDROXYZINE HYDROCHLORIDE 25 MG/1
25 TABLET, FILM COATED ORAL NIGHTLY PRN
Qty: 30 TABLET | Refills: 0 | Status: SHIPPED | OUTPATIENT
Start: 2025-02-18 | End: 2025-03-20

## 2025-02-18 NOTE — TELEPHONE ENCOUNTER
Discussed with pt. Pharmacy updated. Appt scheduled for when she returns to [place]. She will be living in [place] for a short time when she returns so she did prefer [facility].

## 2025-03-03 SDOH — ECONOMIC STABILITY: TRANSPORTATION INSECURITY
IN THE PAST 12 MONTHS, HAS THE LACK OF TRANSPORTATION KEPT YOU FROM MEDICAL APPOINTMENTS OR FROM GETTING MEDICATIONS?: NO

## 2025-03-03 SDOH — ECONOMIC STABILITY: FOOD INSECURITY: WITHIN THE PAST 12 MONTHS, YOU WORRIED THAT YOUR FOOD WOULD RUN OUT BEFORE YOU GOT MONEY TO BUY MORE.: NEVER TRUE

## 2025-03-03 SDOH — ECONOMIC STABILITY: FOOD INSECURITY: WITHIN THE PAST 12 MONTHS, THE FOOD YOU BOUGHT JUST DIDN'T LAST AND YOU DIDN'T HAVE MONEY TO GET MORE.: NEVER TRUE

## 2025-03-03 SDOH — ECONOMIC STABILITY: INCOME INSECURITY: IN THE LAST 12 MONTHS, WAS THERE A TIME WHEN YOU WERE NOT ABLE TO PAY THE MORTGAGE OR RENT ON TIME?: NO

## 2025-03-06 ENCOUNTER — TELEMEDICINE (OUTPATIENT)
Dept: FAMILY MEDICINE CLINIC | Age: 60
End: 2025-03-06
Payer: COMMERCIAL

## 2025-03-06 DIAGNOSIS — G47.00 INSOMNIA, UNSPECIFIED TYPE: ICD-10-CM

## 2025-03-06 DIAGNOSIS — F41.9 ANXIETY: Primary | ICD-10-CM

## 2025-03-06 PROCEDURE — 99213 OFFICE O/P EST LOW 20 MIN: CPT | Performed by: NURSE PRACTITIONER

## 2025-03-06 RX ORDER — HYDROXYZINE HYDROCHLORIDE 25 MG/1
TABLET, FILM COATED ORAL
Qty: 30 TABLET | Refills: 0
Start: 2025-03-06

## 2025-03-06 ASSESSMENT — ENCOUNTER SYMPTOMS
CHEST TIGHTNESS: 0
DIARRHEA: 0
COLOR CHANGE: 0
BACK PAIN: 0
CONSTIPATION: 0
ABDOMINAL DISTENTION: 0
SORE THROAT: 0
RHINORRHEA: 0
COUGH: 0
NAUSEA: 0
SHORTNESS OF BREATH: 0
ABDOMINAL PAIN: 0

## 2025-03-06 NOTE — PROGRESS NOTES
COLONOSCOPY      2014, next one due in 2024   ,   Social History     Tobacco Use    Smoking status: Never    Smokeless tobacco: Never   Vaping Use    Vaping status: Never Used   Substance Use Topics    Alcohol use: Yes     Comment: socially    Drug use: Never   ,   Family History   Problem Relation Age of Onset    No Known Problems Mother     No Known Problems Father    ,   Immunization History   Administered Date(s) Administered    COVID-19, PFIZER PURPLE top, DILUTE for use, (age 12 y+), 30mcg/0.3mL 05/06/2021, 05/31/2021, 01/03/2022    Influenza Trivalent 09/25/2015, 09/20/2016    Influenza Vaccine, unspecified formulation 10/17/2014, 09/25/2015, 09/20/2016    Influenza Virus Vaccine 11/01/2009, 10/01/2010, 10/12/2011, 10/17/2014, 09/25/2015, 09/20/2016, 09/16/2019    Influenza, AFLURIA, FLUZONE, (age4 y+), IM, Trivalent MDV, 0.5mL 09/25/2015    Influenza, FLUARIX, FLULAVAL, FLUZONE (age 6 mo+) and AFLURIA, (age 3 y+), Quadv PF, 0.5mL 09/13/2017, 09/13/2018, 11/03/2020, 10/27/2022, 11/01/2023    Influenza, FLUBLOK, (age 18 y+), IM, Trivalent PF, 0.5mL 09/24/2024    Influenza, FLUCELVAX, (age 6 mo+), MDCK, Quadv PF, 0.5mL 11/17/2021    TDaP, ADACEL (age 10y-64y), BOOSTRIX (age 10y+), IM, 0.5mL 10/31/2016, 05/06/2024    Zoster Recombinant (Shingrix) 09/04/2019, 11/11/2019   ,   Health Maintenance   Topic Date Due    Pneumococcal 50+ years Vaccine (1 of 1 - PCV) Never done    Cervical cancer screen  11/05/2022    COVID-19 Vaccine (4 - 2024-25 season) 09/01/2024    Hepatitis C screen  11/18/2025 (Originally 2/10/1983)    Depression Screen  11/15/2025    Breast cancer screen  07/05/2026    Lipids  11/17/2027    DTaP/Tdap/Td vaccine (3 - Td or Tdap) 05/06/2034    Colorectal Cancer Screen  09/20/2034    Respiratory Syncytial Virus (RSV) Pregnant or age 60 yrs+ (1 - 1-dose 75+ series) 02/10/2040    Flu vaccine  Completed    Shingles vaccine  Completed    Hepatitis A vaccine  Aged Out    Hepatitis B vaccine  Aged Out

## 2025-03-17 DIAGNOSIS — F41.9 ANXIETY: ICD-10-CM

## 2025-03-17 DIAGNOSIS — G47.00 INSOMNIA, UNSPECIFIED TYPE: ICD-10-CM

## 2025-03-17 RX ORDER — HYDROXYZINE HYDROCHLORIDE 25 MG/1
TABLET, FILM COATED ORAL
Qty: 30 TABLET | Refills: 5 | Status: SHIPPED | OUTPATIENT
Start: 2025-03-17

## 2025-04-22 ENCOUNTER — TELEPHONE (OUTPATIENT)
Dept: FAMILY MEDICINE CLINIC | Age: 60
End: 2025-04-22

## 2025-04-22 DIAGNOSIS — Z12.31 BREAST CANCER SCREENING BY MAMMOGRAM: Primary | ICD-10-CM

## 2025-06-27 DIAGNOSIS — F41.9 ANXIETY: ICD-10-CM

## 2025-06-27 DIAGNOSIS — G47.00 INSOMNIA, UNSPECIFIED TYPE: ICD-10-CM

## 2025-06-27 RX ORDER — HYDROXYZINE HYDROCHLORIDE 25 MG/1
TABLET, FILM COATED ORAL
Qty: 90 TABLET | Refills: 1 | Status: SHIPPED | OUTPATIENT
Start: 2025-06-27

## 2025-07-11 DIAGNOSIS — Z12.31 BREAST CANCER SCREENING BY MAMMOGRAM: ICD-10-CM
